# Patient Record
Sex: MALE | Race: WHITE | Employment: OTHER | URBAN - NONMETROPOLITAN AREA
[De-identification: names, ages, dates, MRNs, and addresses within clinical notes are randomized per-mention and may not be internally consistent; named-entity substitution may affect disease eponyms.]

---

## 2021-02-11 ENCOUNTER — OFFICE VISIT (OUTPATIENT)
Dept: ORTHOPEDIC SURGERY | Age: 76
End: 2021-02-11
Payer: MEDICARE

## 2021-02-11 VITALS — WEIGHT: 235 LBS | BODY MASS INDEX: 34.8 KG/M2 | HEIGHT: 69 IN

## 2021-02-11 DIAGNOSIS — M23.203 DEGENERATIVE TEAR OF MEDIAL MENISCUS OF RIGHT KNEE: Primary | ICD-10-CM

## 2021-02-11 PROCEDURE — 20610 DRAIN/INJ JOINT/BURSA W/O US: CPT | Performed by: ORTHOPAEDIC SURGERY

## 2021-02-11 PROCEDURE — 99203 OFFICE O/P NEW LOW 30 MIN: CPT | Performed by: ORTHOPAEDIC SURGERY

## 2021-02-11 RX ORDER — ROSUVASTATIN CALCIUM 40 MG/1
TABLET, COATED ORAL
COMMUNITY
Start: 2020-11-19

## 2021-02-11 RX ORDER — ALLOPURINOL 100 MG/1
TABLET ORAL
COMMUNITY
Start: 2021-01-16

## 2021-02-11 NOTE — PROGRESS NOTES
KNEE VISIT      HISTORY OF PRESENT ILLNESS    Pillo Mcdaniel is a 66 y.o. male who presents for evaluation of right knee pain is had on and off for a long time but recently has had increased pain this causes knee to give out and caused him to nearly fall. The pain he grades 4-6 over 10 in the medial side of his right knee. Once he gets up and down is okay but it is a twisting seems to aggravate it. He has a lot of pain behind the knee also. ROS    Well-documented patient history form dated 2/11/2021  All other ROS negative except for above. Past Surgical history    Past Surgical History:   Procedure Laterality Date    CORONARY ANGIOPLASTY WITH STENT PLACEMENT         PAST MEDICAL    Past Medical History:   Diagnosis Date    Arthritis     Hypertension        Allergies    Allergies   Allergen Reactions    Lipitor [Atorvastatin]        Meds    Current Outpatient Medications   Medication Sig Dispense Refill    allopurinol (ZYLOPRIM) 100 MG tablet       rosuvastatin (CRESTOR) 40 MG tablet       pantoprazole (PROTONIX) 40 MG tablet       levothyroxine (SYNTHROID) 112 MCG tablet       amLODIPine (NORVASC) 10 MG tablet       ADVAIR DISKUS 250-50 MCG/DOSE AEPB       SPIRIVA HANDIHALER 18 MCG inhalation capsule       lisinopril (PRINIVIL;ZESTRIL) 40 MG tablet       NITROSTAT 0.4 MG SL tablet       clopidogrel (PLAVIX) 75 MG tablet       aspirin 81 MG tablet Take 81 mg by mouth daily      Omega-3 Fatty Acids (FISH OIL) 1000 MG CAPS Take 3,000 mg by mouth 2 times daily      Multiple Vitamins-Minerals (THERAPEUTIC MULTIVITAMIN-MINERALS) tablet Take 1 tablet by mouth daily      FIBER PO Take by mouth       No current facility-administered medications for this visit.         Social    Social History     Socioeconomic History    Marital status:      Spouse name: Not on file    Number of children: Not on file    Years of education: Not on file    Highest education level: Not on file Anterior drawer [] [x] [] [x]   Lachman [] [x] [] [x]   Posterior drawer [] [x] [] [x]   Varus testing [] [x] [] [x]   Valgus testing [x] [] [] [x]   Joint line tenderness [x] [] [] [x]     Additional Exam Comments: His neurocirculatory lymphatic exam otherwise is normal symmetric both lower extremities. He does have pain along the medial compartment of his right knee medially to direct palpation valgus stress and Marisol's maneuver. IMAGING STUDIES    X-rays 3 views of the right knee taken at [facility] reveal minimal degenerative change mostly in the patellofemoral joint    IMPRESSION    Right knee pain secondary to degenerative medial meniscus tear    PLAN      1. Conservative care options including physical therapy, NSAIDs, bracing, and activity modification were discussed. 2.  The indications for therapeutic injections were discussed. 3.  The indications for additional imaging studies were discussed. 4.  After considering the various options discussed, the patient elected to pursue a course that includes cortisone injection right knee today and if he is not substantial improved over next few weeks consider scanning    Recommendation is for a cortisone injection into the right knee. After informed consent was received from the patient, the right knee was injected with 1 mL(40mg)Depo-Medrol and 4 mL of 0.25% Marcaine  in the syringe from an anterolateral joint line approach, using a 25-gauge needle, under sterile Betadine prep, using ethyl chloride as a topical refrigerant, for a diagnosis of osteoarthritis. The patient appeared to tolerate it well. The patient should return here periodically as needed. Encounter Diagnosis   Name Primary?  Degenerative tear of medial meniscus of right knee Yes        No orders of the defined types were placed in this encounter.

## 2021-02-18 DIAGNOSIS — M23.203 DEGENERATIVE TEAR OF MEDIAL MENISCUS OF RIGHT KNEE: Primary | ICD-10-CM

## 2021-03-02 RX ORDER — BUPIVACAINE HYDROCHLORIDE 2.5 MG/ML
5 INJECTION, SOLUTION INFILTRATION; PERINEURAL ONCE
Status: COMPLETED | OUTPATIENT
Start: 2021-03-02 | End: 2021-03-02

## 2021-03-02 RX ORDER — METHYLPREDNISOLONE ACETATE 40 MG/ML
40 INJECTION, SUSPENSION INTRA-ARTICULAR; INTRALESIONAL; INTRAMUSCULAR; SOFT TISSUE ONCE
Status: COMPLETED | OUTPATIENT
Start: 2021-03-02 | End: 2021-03-02

## 2021-03-02 RX ADMIN — BUPIVACAINE HYDROCHLORIDE 12.5 MG: 2.5 INJECTION, SOLUTION INFILTRATION; PERINEURAL at 13:51

## 2021-03-02 RX ADMIN — METHYLPREDNISOLONE ACETATE 40 MG: 40 INJECTION, SUSPENSION INTRA-ARTICULAR; INTRALESIONAL; INTRAMUSCULAR; SOFT TISSUE at 13:51

## 2021-03-10 ENCOUNTER — OFFICE VISIT (OUTPATIENT)
Dept: ORTHOPEDIC SURGERY | Age: 76
End: 2021-03-10
Payer: MEDICARE

## 2021-03-10 VITALS — BODY MASS INDEX: 34.8 KG/M2 | WEIGHT: 235 LBS | HEIGHT: 69 IN

## 2021-03-10 DIAGNOSIS — M23.203 DEGENERATIVE TEAR OF MEDIAL MENISCUS OF RIGHT KNEE: Primary | ICD-10-CM

## 2021-03-10 PROCEDURE — 99213 OFFICE O/P EST LOW 20 MIN: CPT | Performed by: ORTHOPAEDIC SURGERY

## 2021-03-10 PROCEDURE — 20610 DRAIN/INJ JOINT/BURSA W/O US: CPT | Performed by: ORTHOPAEDIC SURGERY

## 2021-03-10 RX ORDER — BUPIVACAINE HYDROCHLORIDE 2.5 MG/ML
7 INJECTION, SOLUTION INFILTRATION; PERINEURAL ONCE
Status: COMPLETED | OUTPATIENT
Start: 2021-03-10 | End: 2021-03-10

## 2021-03-10 RX ORDER — METHYLPREDNISOLONE ACETATE 40 MG/ML
40 INJECTION, SUSPENSION INTRA-ARTICULAR; INTRALESIONAL; INTRAMUSCULAR; SOFT TISSUE ONCE
Status: COMPLETED | OUTPATIENT
Start: 2021-03-10 | End: 2021-03-10

## 2021-03-10 RX ADMIN — METHYLPREDNISOLONE ACETATE 40 MG: 40 INJECTION, SUSPENSION INTRA-ARTICULAR; INTRALESIONAL; INTRAMUSCULAR; SOFT TISSUE at 13:55

## 2021-03-10 RX ADMIN — BUPIVACAINE HYDROCHLORIDE 17.5 MG: 2.5 INJECTION, SOLUTION INFILTRATION; PERINEURAL at 13:55

## 2021-03-10 NOTE — PROGRESS NOTES
KNEE VISIT      HISTORY OF PRESENT ILLNESS    Gardenia Shankar is a 66 y.o. male who presents for evaluation of right knee pain. He says he is doing somewhat better now. He has had his MRI and is here for review. He still complains of pain over the medial joint line and anteromedial knee. He has also pain of the patellofemoral joint. ROS    Well-documented patient history form dated 2/11/2021  All other ROS negative except for above. Past Surgical history    Past Surgical History:   Procedure Laterality Date    CORONARY ANGIOPLASTY WITH STENT PLACEMENT         PAST MEDICAL    Past Medical History:   Diagnosis Date    Arthritis     Hypertension        Allergies    Allergies   Allergen Reactions    Lipitor [Atorvastatin]        Meds    Current Outpatient Medications   Medication Sig Dispense Refill    allopurinol (ZYLOPRIM) 100 MG tablet       rosuvastatin (CRESTOR) 40 MG tablet       pantoprazole (PROTONIX) 40 MG tablet       levothyroxine (SYNTHROID) 112 MCG tablet       amLODIPine (NORVASC) 10 MG tablet       ADVAIR DISKUS 250-50 MCG/DOSE AEPB       SPIRIVA HANDIHALER 18 MCG inhalation capsule       lisinopril (PRINIVIL;ZESTRIL) 40 MG tablet       NITROSTAT 0.4 MG SL tablet       clopidogrel (PLAVIX) 75 MG tablet       aspirin 81 MG tablet Take 81 mg by mouth daily      Omega-3 Fatty Acids (FISH OIL) 1000 MG CAPS Take 3,000 mg by mouth 2 times daily      Multiple Vitamins-Minerals (THERAPEUTIC MULTIVITAMIN-MINERALS) tablet Take 1 tablet by mouth daily      FIBER PO Take by mouth       No current facility-administered medications for this visit.         Social    Social History     Socioeconomic History    Marital status:      Spouse name: Not on file    Number of children: Not on file    Years of education: Not on file    Highest education level: Not on file   Occupational History    Not on file   Social Needs    Financial resource strain: Not on file    Food insecurity Worry: Not on file     Inability: Not on file    Transportation needs     Medical: Not on file     Non-medical: Not on file   Tobacco Use    Smoking status: Never Smoker    Smokeless tobacco: Current User   Substance and Sexual Activity    Alcohol use: Not on file    Drug use: Not on file    Sexual activity: Not on file   Lifestyle    Physical activity     Days per week: Not on file     Minutes per session: Not on file    Stress: Not on file   Relationships    Social connections     Talks on phone: Not on file     Gets together: Not on file     Attends Yarsani service: Not on file     Active member of club or organization: Not on file     Attends meetings of clubs or organizations: Not on file     Relationship status: Not on file    Intimate partner violence     Fear of current or ex partner: Not on file     Emotionally abused: Not on file     Physically abused: Not on file     Forced sexual activity: Not on file   Other Topics Concern    Not on file   Social History Narrative    Not on file       Family HISTORY    Family History   Problem Relation Age of Onset    Diabetes Mother     Heart Disease Mother     Diabetes Father     Heart Disease Father     Stroke Father        PHYSICAL EXAM    Vital Signs:  Ht 5' 9\" (1.753 m)   Wt 235 lb (106.6 kg)   BMI 34.70 kg/m²   General Appearance:  Normal body habitus. Alert and oriented to person, place, and time. Affect:  Normal.   Gait: Slightly antalgic. Good balance and coordination. Skin:  Intact. Sensation:  Intact. Strength:  Intact. Reflexes:  Intact. Pulses:  Intact. Knee Exam:    Effusion: Trace but he does have some fluid that is palpable in the Baker's cyst or popliteal region.     Range of Motion Right Left   Extension -5 0   Flexion 110 115     Provocative Test Right Left    Positive Negative Positive Negative   Anterior drawer [] [x] [] [x]   Lachman [] [x] [] [x]   Posterior drawer [] [x] [] [x]   Varus testing [] [x] [] [x] Valgus testing [x] [] [] [x]   Joint line tenderness [x] [] [] [x]     Additional Exam Comments: His neurocirculatory lymphatic exam otherwise is normal symmetric both lower extremities. He does have pain along the medial compartment of his right knee medially to direct palpation valgus stress and Marisol's maneuver. IMAGING STUDIES    I reviewed the MRI which demonstrates chondromalacia patella and some degeneration medial femoral condyle which is moderate. IMPRESSION    Right knee pain secondary to medial plica and chondromalacia patella    PLAN      1. Conservative care options including physical therapy, NSAIDs, bracing, and activity modification were discussed. 2.  The indications for therapeutic injections were discussed. 3.  The indications for additional imaging studies were discussed. 4.  After considering the various options discussed, the patient elected to pursue a course that includes cortisone injection right knee today and if he is not substantial improved over next few weeks consider proceeding with arthroscopic intervention. Recommendation is for a cortisone injection into the right knee. After informed consent was received from the patient, the right knee was injected with 1 mL(40mg)Depo-Medrol and 4 mL of 0.25% Marcaine  in the syringe from an anterolateral joint line approach, using a 25-gauge needle, under sterile Betadine prep, using ethyl chloride as a topical refrigerant, for a diagnosis of osteoarthritis. The patient appeared to tolerate it well. The patient should return here periodically as needed. The patient was counseled at length about the risks of shruthi Covid-19 during their perioperative period and any recovery window from their procedure. The patient was made aware that shruthi Covid-19  may worsen their prognosis for recovering from their procedure  and lend to a higher morbidity and/or mortality risk.   All material risks, benefits, and reasonable alternatives including postponing the procedure were discussed. The patient does wish to proceed with the procedure at this time. INFORMED CONSENT NOTE        We discussed the risks, benefits, and alternatives to the proposed procedure, as well as the necessity of other members of the healthcare team participating in the procedure. All questions were answered and the patient elected to proceed with the proposed procedure and signed the informed consent form.

## 2022-08-29 ENCOUNTER — HOSPITAL ENCOUNTER (OUTPATIENT)
Dept: PHYSICAL THERAPY | Age: 77
Setting detail: THERAPIES SERIES
Discharge: HOME | End: 2022-08-29
Payer: MEDICARE

## 2022-08-29 PROCEDURE — 97140 MANUAL THERAPY 1/> REGIONS: CPT

## 2022-08-29 PROCEDURE — 97161 PT EVAL LOW COMPLEX 20 MIN: CPT

## 2022-08-29 PROCEDURE — 97110 THERAPEUTIC EXERCISES: CPT

## 2022-08-29 PROCEDURE — 97112 NEUROMUSCULAR REEDUCATION: CPT

## 2022-08-29 NOTE — PLAN OF CARE
[address], [address] [address] Michelle Galvez  Phone: [phone], [phone]                                                    Physical Therapy Certification    Dear Referring Provider: Dr. Bakari Bui,    We had the pleasure of evaluating the following patient for physical therapy services at [address] [place] [address]. A summary of our findings can be found in the initial assessment below. This includes our plan of care. If you have any questions or concerns regarding these findings, please do not hesitate to contact me at the office phone number checked above. Thank you for the referral.       Physician Signature:_______________________________Date:__________________  By signing above (or electronic signature), therapists plan is approved by physician      Patient: Liam Ortiz   : 1945   MRN: [id number]  Referring Physician: Referring Provider: Bakari Bui      Evaluation Date: 2022      Medical Diagnosis Information:  Diagnosis: Right RTC Tear (M75.101)   Treatment Diagnosis: Muscle Weakness (M62.81); Right Shoulder Pain (M25.511)                                  Insurance information: PT Insurance Information: [employer] ([employer])    Precautions/ Contra-indications/Relevant Medical History: HTN, COPD, Emphysema    C-SSRS Triggered by Intake questionnaire (Past 2 wk assessment):   [x] No, Questionnaire did not trigger screening.   [] Yes, Patient intake triggered further evaluation      [] C-SSRS Screening completed  [] PCP notified via Plan of Care  [] Emergency services notified     Latex Allergy:  [x]NO      []YES     Preferred Language for Healthcare:   [x]English       []other:     SUBJECTIVE: Patient stated complaint: increased pain and decreased functional use of R UE.     ASSESSMENT: Patient is a 76 y.o. male reporting to OP PT with c/c of increased pain since this past .  Pt is noted to have decreased ROM, decreased strength, decreased functional mobility and overhead use of R UE.     FOTO Score: 53  FOTO Predicted Score: 62    Pain Scale: Current: 5/10; Max: 7/10; Best: 4/10  Easing factors: rest, cortisone shot  Provocative factors: increased over head activity      Type: [x]Constant   []Intermittent  []Radiating []Localized []other:     Numbness/Tingling: Patient reports some numbness and tingling into right hand however has gout and arthritis in his wrist bad. Functional Limitations/Impairments: [x]Lifting/reaching [x]Grooming [x]Carrying    []ADL's []Driving []Sports/Recreations   []Other:    Occupation/School: Retired     Living Status/Prior Level of Function: Independent with ADLs and IADLs     OBJECTIVE:     CERV ROM     Cervical Flexion About 50%    Cervical Extension About 25 %    Cervical SB About 25 % B    Cervical rotation About 25 % B          ROM Left Right   Shoulder Flex 140 °  120 °    Shoulder Abd 160 °  95 °    Shoulder ER Back of Head Back of Head   Shoulder IR T10 Side of Hip   Elbow Flex     Elbow Ext     Wrist Flex     Wrist Ext     Strength  Left Right   Shoulder Flex 4/5 3-/5   Shoulder Scap \" \"   Shoulder ER \" \"   Shoulder IR \" \"   Elbow Flex     Elbow Ext     Wrist Flex     Wrist Ext     Srinivasa        Reflexes/Sensation (myotomes/dermatomes):    [x]Normal    []Abnormal:      Joint mobility:    []Normal    [x]Hypo   []Hyper    Palpation: increased crepitus with ROM     Functional Mobility/Transfers: WFL    Posture: forward rounded shoulders and forward head    Bandages/Dressings/Incisions: n/a    Gait (include devices/WB status): decreased arm swing     Orthopedic Special Tests: n/a    [x] Patient history, allergies, meds reviewed. Medical chart reviewed. See intake form. Review Of Systems (ROS):  [x]Performed Review of systems (Integumentary, CardioPulmonary, Neurological) by intake and observation. Intake form has been scanned into medical record.  Patient has been instructed to contact their primary care physician regarding ROS issues if not already being addressed at this time. Co-morbidities/Complexities (which will affect course of rehabilitation):   []None           Arthritic conditions   []Rheumatoid arthritis (M05.9)  [x]Osteoarthritis (M19.91)   Cardiovascular conditions   [x]Hypertension (I10)  [x]Hyperlipidemia (E78.5)  []Angina pectoris (I20)  []Atherosclerosis (I70)   Musculoskeletal conditions   []Disc pathology   []Congenital spine pathologies   [x]Prior surgical intervention: Left Pinning of Wrist; Hx of Stent Placement  []Osteoporosis (M81.8)  []Osteopenia (M85.8)   Endocrine conditions   []Hypothyroid (E03.9)  []Hyperthyroid Gastrointestinal conditions   []Constipation (G72.91)   Metabolic conditions   []Morbid obesity (E66.01)  []Diabetes type 1(E10.65) or 2 (E11.65)   []Neuropathy (G60.9)     Pulmonary conditions   []Asthma (J45)  []Coughing   [x]COPD (J44.9)   Psychological Disorders  []Anxiety (F41.9)  []Depression (F32.9)   []Other:   [x]Other:  Gout ; Stage 4 Kidney Failure, Emphysema       Barriers to/and or personal factors that will affect rehab potential:              []Age  []Sex              []Motivation/Lack of Motivation                        []Co-Morbidities              []Cognitive Function, education/learning barriers              []Environmental, home barriers              []profession/work barriers  []past PT/medical experience  []other:  Justification:     Falls Risk Assessment (30 days):   [x] Falls Risk assessed and no intervention required.   [] Falls Risk assessed and Patient requires intervention due to being higher risk   TUG score (>12s at risk):     [] Falls education provided, including           ASSESSMENT:   Functional Impairments   [x]Noted spinal or UE joint hypomobility   []Noted spinal or UE joint hypermobility   [x]Decreased UE functional ROM   [x]Decreased UE functional strength   []Abnormal Evaluation Complexity Justification   [x] A history of present problem with:  [] no personal factors and/or comorbidities that impact the plan of care;  []1-2 personal factors and/or comorbidities that impact the plan of care  [x]3 personal factors and/or comorbidities that impact the plan of care  [x] An examination of body systems using standardized tests and measures addressing any of the following: body structures and functions (impairments), activity limitations, and/or participation restrictions;:  [] a total of 1-2 or more elements   [] a total of 3 or more elements   [x] a total of 4 or more elements   [x] A clinical presentation with:  [x] stable and/or uncomplicated characteristics   [] evolving clinical presentation with changing characteristics  [] unstable and unpredictable characteristics;   [x] Clinical decision making of [x] low, [] moderate, [] high complexity using standardized patient assessment instrument and/or measurable assessment of functional outcome. [x] EVAL (LOW) 27486 (typically 20 minutes face-to-face)  [] EVAL (MOD) 83651 (typically 30 minutes face-to-face)  [] EVAL (HIGH) 06835 (typically 45 minutes face-to-face)  [] RE-EVAL     PLAN:  Frequency/Duration:  1-2 days per week for 12 weeks:  INTERVENTIONS:  [x]  Therapeutic exercise including: strength training, ROM, for upper extremity and core   [x]  NMR activation and proprioception for UE and Core   [x]  Manual therapy as indicated for UE and spine to include: Dry Needling/IASTM, STM, PROM, Gr I-IV mobilizations, manipulation. [x] Modalities as needed that may include: thermal agents, E-stim, Biofeedback, US, iontophoresis as indicated  [x] Patient education on joint protection, postural re-education, activity modification, progression of HEP.     HEP instruction: Refer to Jazmin Mancini access code and exercises on the 1st visit treatment note    GOALS:  Patient stated goal: To be able to use R UE without increased pain    Therapist goals for Patient:   Short Term Goals: To be achieved in: 2 weeks  1. Independent in HEP and progression per patient tolerance, in order to prevent re-injury. [] Progressing: [] Met: [] Not Met: [] Adjusted   2. Patient will have a decrease in pain to facilitate improvement in movement, function, and ADLs as indicated by Functional Deficits. [] Progressing: [] Met: [] Not Met: [] Adjusted     Long Term Goals: To be achieved in: 12 weeks  1. FOTO score will be within 10 points of the predicted score to assist with reaching prior level of function. [] Progressing: [] Met: [] Not Met: [] Adjusted   2. Patient will demonstrate increased AROM flex/abd/ER/IR to 140 ° /140 ° /C6/T10 to allow for proper joint functioning as indicated by patients Functional Deficits. [] Progressing: [] Met: [] Not Met: [] Adjusted   3. Patient will demonstrate an increase in Strength R Shoulder grossly  to 4/5 to allow for proper functional mobility as indicated by patients Functional Deficits. [] Progressing: [] Met: [] Not Met: [] Adjusted   4. Patient will return to  functional activities without increased symptoms or restriction. [] Progressing: [] Met: [] Not Met: [] Adjusted   5. Patient will have decreased pain to  </= 2 /10 with all functional mobility and functional activities. [] Progressing: [] Met: [] Not Met: [] Adjusted    6.  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

## 2022-08-29 NOTE — FLOWSHEET NOTE
[facility], [address] [place] [address] [place], [address]  Phone: [phone], [phone]    Physical Therapy Treatment Note/ Progress Report:     Date:  2022    Patient Name:  Shiraz Fernando    :  1945  MRN: [id number]  Restrictions/Precautions:    Medical/Treatment Diagnosis Information:      Treatment Diagnosis: Muscle Weakness (M62.81); Right Shoulder Pain (M25.511)             Insurance/Certification information:  PT Insurance Information: [employer] ([employer])  Physician Information:  Referring Provider: Shivani Molina  Has the plan of care been signed (Y/N):        []  Yes  [x]  No     Date of Patient follow up with Physician:     Is this a Progress Report:     []  Yes  [x]  No      If Yes:  Date Range for reporting period:  Initial Eval: 2022  Beginnin2022 --- Endin2022    Progress report will be due (10 Rx or 30 days whichever is less):      Recertification will be due (POC Duration  / 90 days whichever is less): 2022      Visit # Insurance Allowable Auth Required   In Person Eval Auth Thru [employer]Here after initial evaluation [x]  Yes     []  No    Tele Health -  []  Yes     []  No    Total Eval       FOTO Score: 53 (Predicted: 62)   Date assessed: 2022     Latex Allergy:  [x]NO      []YES  Preferred Language for Healthcare:   [x]English       []other:    Pain level: Current: 5/10; Max: 7/10;  Best: 4/10    SUBJECTIVE:  See eval    OBJECTIVE: See eval  Observation:   Test measurements:      RESTRICTIONS/PRECAUTIONS:     Exercises/Interventions:   Therapeutic Ex (01699)  Therapeutic Activity (09446)  NMR re-education (96219) Sets/Reps Notes/CUES   Pulley          Scap Squeezes 20 x     Shoulder Shrug  20 x          Supine Cane Flex 15 x     Supine Cane ER 15 x     Supine Cane Press 20 x          Wall Slides 20 x 5\"    Wall Push Up 20 x               Postural Ed  10'                                                           Manual Reviewed/Progressed HEP activities related to strengthening, flexibility, endurance, ROM of scapular, scapulothoracic and UE control with self care, reaching, carrying, lifting, house/yardwork, driving/computer work  [x] (93774) Reviewed/Progressed HEP activities related to improving balance, coordination, kinesthetic sense, posture, motor skill, proprioception of scapular, scapulothoracic and UE control with self care, reaching, carrying, lifting, house/yardwork, driving/computer work      Manual Treatments:  PROM / STM / Oscillations-Mobs:  G-I, II, III, IV (PA's, Inf., Post.)  [x] (93927) Provided manual therapy to mobilize soft tissue/joints of cervical/CT, scapular GHJ and UE for the purpose of modulating pain, promoting relaxation,  increasing ROM, reducing/eliminating soft tissue swelling/inflammation/restriction, improving soft tissue extensibility and allowing for proper ROM for normal function with self care, reaching, carrying, lifting, house/yardwork, driving/computer work    Modalities:     [] GAME READY (VASO)- for significant edema, swelling, pain control. Charges:  Timed Code Treatment Minutes: 40'   Total Treatment Minutes:  54'   [facility]:  [facility] TIME:  MANUAL TIME:  UNTIMED MINUTES:  Medicare Total:   -  -  -  -  -      [x] EVAL (LOW) 78795 (typically 20 minutes face-to-face)  [] EVAL (MOD) 39147 (typically 30 minutes face-to-face)  [] EVAL (HIGH) 10650 (typically 45 minutes face-to-face)  [] RE-EVAL     [x] PG(35207) x 1    [] IONTO  [x] NMR (32459) x  1   [] VASO  [x] Manual (42421) x 1    [] Other:  [] TA x      [] Mech Traction (19555)  [] ES(attended) (39359)     [] ES (un) (99774):    ASSESSMENT SUMMARY:   Patient is a 76 y.o. male reporting to OP PT with c/c of increased pain since this past June. Pt is noted to have decreased ROM, decreased strength, decreased functional mobility and overhead use of R UE.           Patient received education on their current pathology and how their condition effects them with their functional activities. Patient understood discussion and questions were answered. Patient understands their activity limitations and understands rational for treatment progression. Pt educated on plan of care and HEP, if worsening symptoms to d/c that exercise. GOALS:   Patient stated goal: To be able to use R UE without increased pain     Therapist goals for Patient:   Short Term Goals: To be achieved in: 2 weeks  1. Independent in HEP and progression per patient tolerance, in order to prevent re-injury. [] Progressing: [] Met: [] Not Met: [] Adjusted   2. Patient will have a decrease in pain to facilitate improvement in movement, function, and ADLs as indicated by Functional Deficits. [] Progressing: [] Met: [] Not Met: [] Adjusted      Long Term Goals: To be achieved in: 12 weeks  1. FOTO score will be within 10 points of the predicted score to assist with reaching prior level of function. [] Progressing: [] Met: [] Not Met: [] Adjusted   2. Patient will demonstrate increased AROM flex/abd/ER/IR to 140 ° /140 ° /C6/T10 to allow for proper joint functioning as indicated by patients Functional Deficits. [] Progressing: [] Met: [] Not Met: [] Adjusted   3. Patient will demonstrate an increase in Strength R Shoulder grossly  to 4/5 to allow for proper functional mobility as indicated by patients Functional Deficits. [] Progressing: [] Met: [] Not Met: [] Adjusted   4. Patient will return to  functional activities without increased symptoms or restriction. [] Progressing: [] Met: [] Not Met: [] Adjusted   5. Patient will have decreased pain to  </= 2 /10 with all functional mobility and functional activities. [] Progressing: [] Met: [] Not Met: [] Adjusted     6.  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

## 2022-09-07 ENCOUNTER — HOSPITAL ENCOUNTER (OUTPATIENT)
Dept: PHYSICAL THERAPY | Age: 77
Setting detail: THERAPIES SERIES
Discharge: HOME | End: 2022-09-07
Payer: MEDICARE

## 2022-09-07 PROCEDURE — 97112 NEUROMUSCULAR REEDUCATION: CPT

## 2022-09-07 PROCEDURE — 97110 THERAPEUTIC EXERCISES: CPT

## 2022-09-07 PROCEDURE — 97140 MANUAL THERAPY 1/> REGIONS: CPT

## 2022-09-07 NOTE — FLOWSHEET NOTE
[facility], [address] [place] [address] [place] [address], [address]  Phone: [phone], [phone]    Physical Therapy Treatment Note/ Progress Report:     Date:  2022    Patient Name:  Liam Ortiz    :  1945  MRN: [id number]  Restrictions/Precautions:    Medical/Treatment Diagnosis Information:  Right RTC Tear (M75.101)  Treatment Diagnosis: Muscle Weakness (M62.81); Right Shoulder Pain (M25.511)             Insurance/Certification information:  PT Insurance Information: [employer] Medicare ([employer]Here)  Physician Information:  Referring Provider: Bakari Bui  Has the plan of care been signed (Y/N):        []  Yes  [x]  No     Date of Patient follow up with Physician:     Is this a Progress Report:     []  Yes  [x]  No      If Yes:  Date Range for reporting period:  Initial Eval: 2022  Beginnin2022 --- Endin2022    Progress report will be due (10 Rx or 30 days whichever is less): 43     Recertification will be due (POC Duration  / 90 days whichever is less): 2022      Visit # Insurance Allowable Auth Required   In Person Eval + 1 24 visits    Auth Thru [employer]Here after initial evaluation [x]  Yes     []  No    Tele Health -  []  Yes     []  No    Total Eval +        FOTO Score: 53 (Predicted: 62)   Date assessed: 2022     Latex Allergy:  [x]NO      []YES      Preferred Language for Healthcare:   [x]English       []other:    Pain level: Current: 4/10; Max: 7/10;  Best: 4/10    SUBJECTIVE:  Pt reports having     OBJECTIVE: See eval  Observation:   Test measurements:      RESTRICTIONS/PRECAUTIONS:     Exercises/Interventions:   Therapeutic Ex (78186)  Therapeutic Activity (25343)  NMR re-education (53446) Sets/Reps Notes/CUES   Pulley 5'         Scap Squeezes 20 x     Shoulder Shrug  20 x          Supine Cane Flex 15 x     Supine Cane ER 15 x     Supine Cane Press 20 x     Supine Punch 20 x     Supine ABC's 1 x          SL ER/Abd 20 x          Wall Slides 20 x 5\"    Wall Push Up with yellow swiss ball 20 x          Rows T-Band 20 x  Red TBand   Ext T-Band 20 x  Red TBand   B ER T-Band 20 x  Red TBand   Horizontal Abduction T-Band 2 x 10 Red Tband                   Postural Ed  10'                                                           Manual Intervention (26610)     Shoulder Jt Mobs 5'    PROM Shoulder 10'                        Medbridge access code:   Access Code: [email or web address]  URL: [email or web address]  Date: 08/29/2022  Prepared by: Marilu Weiss    Exercises  Supine Shoulder Flexion Extension AAROM with Dowel - 1 x daily - 7 x weekly - 1 sets - 10 reps - 10\" hold  Supine Shoulder Press with Dowel - 1 x daily - 7 x weekly - 1 sets - 20 reps  Supine Shoulder External Rotation in 45 Degrees Abduction AAROM with Dowel - 1 x daily - 7 x weekly - 1 sets - 10 reps - 10 hold  Standing Scapular Retraction - 1 x daily - 7 x weekly - 3 sets - 10 reps  Wall Push Up with Arms Wide - 1 x daily - 7 x weekly - 1 sets - 20 reps  Shoulder Flexion Wall Slide with Towel - 1 x daily - 7 x weekly - 1 sets - 10 reps - 10\" hold       Access Code: [email or web address]  URL: [email or web address]  Date: 09/07/2022  Prepared by: Marilu Weiss    Exercises  Standing Shoulder Row with Anchored Resistance - 1 x daily - 7 x weekly - 2 sets - 10 reps  Standing Shoulder Extension with Resistance - 1 x daily - 7 x weekly - 2 sets - 10 reps  Shoulder External Rotation and Scapular Retraction with Resistance - 1 x daily - 7 x weekly - 2 sets - 10 reps  Standing Shoulder Horizontal Abduction with Resistance - 1 x daily - 7 x weekly - 2 sets - 10 reps               Patient Education 10' Pt education with HEP and progression of PT along with compliance with HEP to aide with formal PT for optimal outcomes.           Therapeutic Exercise and NMR EXR  [x] (49725) Provided verbal/tactile cueing for activities related to strengthening, flexibility, endurance, ROM  for improvements in scapular, scapulothoracic and UE control with self care, reaching, carrying, lifting, house/yardwork, driving/computer work. [x] (95280) Provided verbal/tactile cueing for activities related to improving balance, coordination, kinesthetic sense, posture, motor skill, proprioception  to assist with  scapular, scapulothoracic and UE control with self care, reaching, carrying, lifting, house/yardwork, driving/computer work. Therapeutic Activities:    [x] (39766 or 42092) Provided verbal/tactile cueing for activities related to improving balance, coordination, kinesthetic sense, posture, motor skill, proprioception and motor activation to allow for proper function of scapular, scapulothoracic and UE control with self care, carrying, lifting, driving/computer work. Home Exercise Program:    [x] (66950) Reviewed/Progressed HEP activities related to strengthening, flexibility, endurance, ROM of scapular, scapulothoracic and UE control with self care, reaching, carrying, lifting, house/yardwork, driving/computer work  [x] (46180) Reviewed/Progressed HEP activities related to improving balance, coordination, kinesthetic sense, posture, motor skill, proprioception of scapular, scapulothoracic and UE control with self care, reaching, carrying, lifting, house/yardwork, driving/computer work      Manual Treatments:  PROM / STM / Oscillations-Mobs:  G-I, II, III, IV (PA's, Inf., Post.)  [x] (79488) Provided manual therapy to mobilize soft tissue/joints of cervical/CT, scapular GHJ and UE for the purpose of modulating pain, promoting relaxation,  increasing ROM, reducing/eliminating soft tissue swelling/inflammation/restriction, improving soft tissue extensibility and allowing for proper ROM for normal function with self care, reaching, carrying, lifting, house/yardwork, driving/computer work    Modalities:     [] GAME READY (VASO)- for significant edema, swelling, pain control.      Charges:  Timed Code Treatment Minutes: 54' Total Treatment Minutes:  55'   BWC:  TE TIME:  NMR TIME:  MANUAL TIME:  UNTIMED MINUTES:  Medicare Total:   -  -  -  -  -      [] EVAL (LOW) 12279 (typically 20 minutes face-to-face)  [] EVAL (MOD) 12536 (typically 30 minutes face-to-face)  [] EVAL (HIGH) 34846 (typically 45 minutes face-to-face)  [] RE-EVAL     [x] QP(20841) x 2    [] IONTO  [x] NMR (19613) x  1   [] VASO  [x] Manual (45266) x 1    [] Other:  [] TA x      [] Mech Traction (33871)  [] ES(attended) (01426)     [] ES (un) (28901):    ASSESSMENT SUMMARY:   Patient is a 76 y.o. male reporting to OP PT with c/c of increased pain since this past June. Pt is noted to have decreased ROM, decreased strength, decreased functional mobility and overhead use of R UE. Patient received education on their current pathology and how their condition effects them with their functional activities. Patient understood discussion and questions were answered. Patient understands their activity limitations and understands rational for treatment progression. Pt educated on plan of care and HEP, if worsening symptoms to d/c that exercise. GOALS:   Patient stated goal: To be able to use R UE without increased pain     Therapist goals for Patient:   Short Term Goals: To be achieved in: 2 weeks  1. Independent in HEP and progression per patient tolerance, in order to prevent re-injury. [] Progressing: [] Met: [] Not Met: [] Adjusted   2. Patient will have a decrease in pain to facilitate improvement in movement, function, and ADLs as indicated by Functional Deficits. [] Progressing: [] Met: [] Not Met: [] Adjusted      Long Term Goals: To be achieved in: 12 weeks  1. FOTO score will be within 10 points of the predicted score to assist with reaching prior level of function. [] Progressing: [] Met: [] Not Met: [] Adjusted   2.  Patient will demonstrate increased AROM flex/abd/ER/IR to 140 ° /140 ° /C6/T10 to allow for proper joint functioning as indicated by patients Functional Deficits. [] Progressing: [] Met: [] Not Met: [] Adjusted   3. Patient will demonstrate an increase in Strength R Shoulder grossly  to 4/5 to allow for proper functional mobility as indicated by patients Functional Deficits. [] Progressing: [] Met: [] Not Met: [] Adjusted   4. Patient will return to  functional activities without increased symptoms or restriction. [] Progressing: [] Met: [] Not Met: [] Adjusted   5. Patient will have decreased pain to  </= 2 /10 with all functional mobility and functional activities. [] Progressing: [] Met: [] Not Met: [] Adjusted     6. 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 [] Progression is slowed due to complexities/Impairments listed. [] Progression has been slowed due to co-morbidities.   [x] Plan just implemented, too soon to assess goals progression <30days   [] Goals require adjustment due to lack of progress  [] Patient is not progressing as expected and requires additional follow up with physician  [] Other    Prognosis for POC: [x] Good [] Fair  [] Poor    Patient requires continued skilled intervention: [x] Yes  [] No    Treatment/Activity Tolerance:  [x] Patient able to complete treatment  [] Patient limited by fatigue  [] Patient limited by pain    [] Patient limited by other medical complications  [] Other:     Return to Play: (if applicable)   []  Stage 1: Intro to Strength   []  Stage 2: Return to Run and Strength   []  Stage 3: Return to Jump and Strength   []  Stage 4: Dynamic Strength and Agility   []  Stage 5: Sport Specific Training     []  Ready to Return to Play, Meets All Above Stages   []  Not Ready for Return to Sports   Comments:                         PLAN: See eval  [x] Continue per plan of care [] Alter current plan (see comments above)  [] Plan of care initiated [] Hold pending MD visit [] Discharge    Electronically signed by:  Pam Marie, PT, MPT,ATC, cert DN     Note: If patient does not return for scheduled/ recommended follow up visits, this note will serve as a discharge from care along with most recent update on progress.

## 2022-09-14 ENCOUNTER — HOSPITAL ENCOUNTER (OUTPATIENT)
Dept: PHYSICAL THERAPY | Age: 77
Setting detail: THERAPIES SERIES
Discharge: HOME | End: 2022-09-14
Payer: MEDICARE

## 2022-09-14 PROCEDURE — 97140 MANUAL THERAPY 1/> REGIONS: CPT

## 2022-09-14 PROCEDURE — 97112 NEUROMUSCULAR REEDUCATION: CPT

## 2022-09-14 PROCEDURE — 97110 THERAPEUTIC EXERCISES: CPT

## 2022-09-14 NOTE — FLOWSHEET NOTE
[facility], [address] [place] [address] Shawn [place]wood 62, [address]  Phone: [phone], [phone]    Physical Therapy Treatment Note/ Progress Report:     Date:  2022    Patient Name:  Nikia Huber    :  1945  MRN: [id number]  Restrictions/Precautions:    Medical/Treatment Diagnosis Information:  Right RTC Tear (M75.101)  Treatment Diagnosis: Muscle Weakness (M62.81); Right Shoulder Pain (M25.511)             Insurance/Certification information:  PT Insurance Information: [employer] Medicare (CoHere)  Physician Information:  Referring Provider: Víctor Niño  Has the plan of care been signed (Y/N):        []  Yes  [x]  No     Date of Patient follow up with Physician:     Is this a Progress Report:     []  Yes  [x]  No      If Yes:  Date Range for reporting period:  Initial Eval: 2022  Beginnin2022 --- Endin2022    Progress report will be due (10 Rx or 30 days whichever is less): 588     Recertification will be due (POC Duration  / 90 days whichever is less): 2022      Visit # Insurance Allowable Auth Required   In Person Eval +  24 visits  (22 - 22)  Meet Patch Thru [employer] after initial evaluation [x]  Yes     []  No    Tele Health -  []  Yes     []  No    Total Eval +        FOTO Score: 53 (Predicted: 62)   Date assessed: 2022     Latex Allergy:  [x]NO      []YES      Preferred Language for Healthcare:   [x]English       []other:    Pain level: Current: 4/10; Max: 7/10;  Best: 4/10    SUBJECTIVE:  Pt reports having     OBJECTIVE: See eval  Observation:   Test measurements:      RESTRICTIONS/PRECAUTIONS:     Exercises/Interventions:   Therapeutic Ex (85436)  Therapeutic Activity (34143)  NMR re-education (80641) Sets/Reps Notes/CUES   Pulley 5'         Scap Squeezes 20 x     Shoulder Shrug  20 x          Supine Cane Flex 10 x 10\"     Supine Cane ER 10 x 10\"    Supine Cane Press 20 x     Supine Cane Punch 20 x     Supine ABC's 1 x     Supine Scap Squeeze 20 x     Supine Horizontal Abd  2 x 10 x              SL ER/Abd 20 x          Wall Slides 20 x 5\"    Wall Push Up with yellow swiss ball 20 x          Rows T-Band 20 x  Red TBand   Ext T-Band 20 x  Red TBand   B ER T-Band 20 x  Red TBand   Horizontal Abduction T-Band 2 x 10 Red Tband                   Postural Ed  10'                                                           Manual Intervention (52277)     Shoulder Jt Mobs 5'    PROM Shoulder 5'                        Medbridge access code:   Access Code: [email or web address]  URL: [email or web address]  Date: 08/29/2022  Prepared by: Corey Lozoya    Exercises  Supine Shoulder Flexion Extension AAROM with Dowel - 1 x daily - 7 x weekly - 1 sets - 10 reps - 10\" hold  Supine Shoulder Press with Dowel - 1 x daily - 7 x weekly - 1 sets - 20 reps  Supine Shoulder External Rotation in 45 Degrees Abduction AAROM with Dowel - 1 x daily - 7 x weekly - 1 sets - 10 reps - 10 hold  Standing Scapular Retraction - 1 x daily - 7 x weekly - 3 sets - 10 reps  Wall Push Up with Arms Wide - 1 x daily - 7 x weekly - 1 sets - 20 reps  Shoulder Flexion Wall Slide with Towel - 1 x daily - 7 x weekly - 1 sets - 10 reps - 10\" hold       Access Code: [email or web address]  URL: [email or web address]  Date: 09/07/2022  Prepared by: Corey Lozoya    Exercises  Standing Shoulder Row with Anchored Resistance - 1 x daily - 7 x weekly - 2 sets - 10 reps  Standing Shoulder Extension with Resistance - 1 x daily - 7 x weekly - 2 sets - 10 reps  Shoulder External Rotation and Scapular Retraction with Resistance - 1 x daily - 7 x weekly - 2 sets - 10 reps  Standing Shoulder Horizontal Abduction with Resistance - 1 x daily - 7 x weekly - 2 sets - 10 reps               Patient Education 10' Pt education with HEP and progression of PT along with compliance with HEP to aide with formal PT for optimal outcomes.           Therapeutic Exercise and NMR EXR  [x] (96602) Provided verbal/tactile cueing for activities related to strengthening, flexibility, endurance, ROM  for improvements in scapular, scapulothoracic and UE control with self care, reaching, carrying, lifting, house/yardwork, driving/computer work. [x] (70122) Provided verbal/tactile cueing for activities related to improving balance, coordination, kinesthetic sense, posture, motor skill, proprioception  to assist with  scapular, scapulothoracic and UE control with self care, reaching, carrying, lifting, house/yardwork, driving/computer work. Therapeutic Activities:    [x] (53811 or 25286) Provided verbal/tactile cueing for activities related to improving balance, coordination, kinesthetic sense, posture, motor skill, proprioception and motor activation to allow for proper function of scapular, scapulothoracic and UE control with self care, carrying, lifting, driving/computer work.      Home Exercise Program:    [x] (67470) Reviewed/Progressed HEP activities related to strengthening, flexibility, endurance, ROM of scapular, scapulothoracic and UE control with self care, reaching, carrying, lifting, house/yardwork, driving/computer work  [x] (53163) Reviewed/Progressed HEP activities related to improving balance, coordination, kinesthetic sense, posture, motor skill, proprioception of scapular, scapulothoracic and UE control with self care, reaching, carrying, lifting, house/yardwork, driving/computer work      Manual Treatments:  PROM / STM / Oscillations-Mobs:  G-I, II, III, IV (PA's, Inf., Post.)  [x] (65729) Provided manual therapy to mobilize soft tissue/joints of cervical/CT, scapular GHJ and UE for the purpose of modulating pain, promoting relaxation,  increasing ROM, reducing/eliminating soft tissue swelling/inflammation/restriction, improving soft tissue extensibility and allowing for proper ROM for normal function with self care, reaching, carrying, lifting, house/yardwork, driving/computer work    Modalities:     [] GAME READY (VASO)- for significant edema, swelling, pain control. Charges:  Timed Code Treatment Minutes: 54'   Total Treatment Minutes:  54'   [facility]:  [facility] TIME:  MANUAL TIME:  UNTIMED MINUTES:  Medicare Total:   -  -  -  -  -      [] EVAL (LOW) 86471 (typically 20 minutes face-to-face)  [] EVAL (MOD) 81539 (typically 30 minutes face-to-face)  [] EVAL (HIGH) 48366 (typically 45 minutes face-to-face)  [] RE-EVAL     [x] MV(08819) x 2    [] IONTO  [x] NMR (82046) x  1   [] VASO  [x] Manual (37881) x 1    [] Other:  [] TA x      [] Mech Traction (52499)  [] ES(attended) (46075)     [] ES (un) (28825):    ASSESSMENT SUMMARY:   Patient is a 76 y.o. male reporting to OP PT with c/c of increased pain since this past June. Pt is noted to have decreased ROM, decreased strength, decreased functional mobility and overhead use of R UE. Patient received education on their current pathology and how their condition effects them with their functional activities. Patient understood discussion and questions were answered. Patient understands their activity limitations and understands rational for treatment progression. Pt educated on plan of care and HEP, if worsening symptoms to d/c that exercise. GOALS:   Patient stated goal: To be able to use R UE without increased pain     Therapist goals for Patient:   Short Term Goals: To be achieved in: 2 weeks  1. Independent in HEP and progression per patient tolerance, in order to prevent re-injury. [] Progressing: [] Met: [] Not Met: [] Adjusted   2. Patient will have a decrease in pain to facilitate improvement in movement, function, and ADLs as indicated by Functional Deficits. [] Progressing: [] Met: [] Not Met: [] Adjusted      Long Term Goals: To be achieved in: 12 weeks  1. FOTO score will be within 10 points of the predicted score to assist with reaching prior level of function. [] Progressing: [] Met: [] Not Met: [] Adjusted   2.  Patient will demonstrate increased AROM flex/abd/ER/IR to 140 ° /140 ° /C6/T10 to allow for proper joint functioning as indicated by patients Functional Deficits. [] Progressing: [] Met: [] Not Met: [] Adjusted   3. Patient will demonstrate an increase in Strength R Shoulder grossly  to 4/5 to allow for proper functional mobility as indicated by patients Functional Deficits. [] Progressing: [] Met: [] Not Met: [] Adjusted   4. Patient will return to  functional activities without increased symptoms or restriction. [] Progressing: [] Met: [] Not Met: [] Adjusted   5. Patient will have decreased pain to  </= 2 /10 with all functional mobility and functional activities. [] Progressing: [] Met: [] Not Met: [] Adjusted     6. 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 [] Progression is slowed due to complexities/Impairments listed. [] Progression has been slowed due to co-morbidities.   [x] Plan just implemented, too soon to assess goals progression <30days   [] Goals require adjustment due to lack of progress  [] Patient is not progressing as expected and requires additional follow up with physician  [] Other    Prognosis for POC: [x] Good [] Fair  [] Poor    Patient requires continued skilled intervention: [x] Yes  [] No    Treatment/Activity Tolerance:  [x] Patient able to complete treatment  [] Patient limited by fatigue  [] Patient limited by pain    [] Patient limited by other medical complications  [] Other:     Return to Play: (if applicable)   []  Stage 1: Intro to Strength   []  Stage 2: Return to Run and Strength   []  Stage 3: Return to Jump and Strength   []  Stage 4: Dynamic Strength and Agility   []  Stage 5: Sport Specific Training     []  Ready to Return to Play, Meets All Above Stages   []  Not Ready for Return to Sports   Comments:                         PLAN: See eval  [x] Continue per plan of care [] Alter current plan (see comments above)  [] Plan of care initiated [] Hold pending MD visit [] Discharge    Electronically signed by:  Eve Chapin, PT, MPT,ATC, cert DN     Note: If patient does not return for scheduled/ recommended follow up visits, this note will serve as a discharge from care along with most recent update on progress.

## 2022-09-19 ENCOUNTER — HOSPITAL ENCOUNTER (OUTPATIENT)
Dept: PHYSICAL THERAPY | Age: 77
Setting detail: THERAPIES SERIES
Discharge: HOME | End: 2022-09-19
Payer: MEDICARE

## 2022-09-19 PROCEDURE — 97140 MANUAL THERAPY 1/> REGIONS: CPT

## 2022-09-19 PROCEDURE — 97110 THERAPEUTIC EXERCISES: CPT

## 2022-09-19 PROCEDURE — 97112 NEUROMUSCULAR REEDUCATION: CPT

## 2022-09-19 NOTE — FLOWSHEET NOTE
[facility], [address] [place] [address] Roby [place], [address]  Phone: [phone], [phone]    Physical Therapy Treatment Note/ Progress Report:     Date:  2022    Patient Name:  Bon Peña    :  1945  MRN: [id number]  Restrictions/Precautions:    Medical/Treatment Diagnosis Information:  Right RTC Tear (M75.101)  Treatment Diagnosis: Muscle Weakness (M62.81); Right Shoulder Pain (M25.511)             Insurance/Certification information:  PT Insurance Information: [employer] Medicare ([employer]Here)  Physician Information:  Referring Provider: Kelly Kessler  Has the plan of care been signed (Y/N):        []  Yes  [x]  No     Date of Patient follow up with Physician:     Is this a Progress Report:     []  Yes  [x]  No      If Yes:  Date Range for reporting period:  Initial Eval: 2022  Beginnin2022 --- Endin2022    Progress report will be due (10 Rx or 30 days whichever is less): 8095     Recertification will be due (POC Duration  / 90 days whichever is less): 2022      Visit # Insurance Allowable Auth Required   In Person Eval +  24 visits  (22 - 22)  Auth Thru [employer]Here after initial evaluation [x]  Yes     []  No    Tele Health -  []  Yes     []  No    Total Eval +        FOTO Score: 53 (Predicted: 62)   Date assessed: 2022     Latex Allergy:  [x]NO      []YES      Preferred Language for Healthcare:   [x]English       []other:    Pain level: Current: 2-4/10     SUBJECTIVE:  Pt reports feeling okay, states his other shoulder starting to hurt, also stated wants to try just doing the HEP and not come in for formal PT any more.      OBJECTIVE: See eval  Observation:   Test measurements:      RESTRICTIONS/PRECAUTIONS:     Exercises/Interventions:   Therapeutic Ex (41612)  Therapeutic Activity (28084)  NMR re-education (94008) Sets/Reps Notes/CUES   Pulley 5'         Scap Squeezes 20 x     Shoulder Shrug  20 x          Supine Cane Flex 10 x 10\" Supine Cane ER 10 x 10\"    Supine Cane Press 20 x     Supine Cane Punch 20 x     Supine ABC's 1 x     Supine Scap Squeeze 20 x     Supine Horizontal Abd  2 x 10 x              SL ER/Abd 20 x          Wall Slides 20 x 5\"    Wall Push Up with yellow swiss ball 20 x          Rows T-Band 20 x  GreenTBand   Ext T-Band 20 x  Green TBand   ER/IR TBand 20 x  Red Tband    B ER T-Band 20 x  Red TBand   Horizontal Abduction T-Band 2 x 10 Red Tband                   Postural Ed  10'                                                           Manual Intervention (97724)     Shoulder Jt Mobs 5'    PROM Shoulder 5'                        Medbridge access code:   Access Code: [email or web address]  URL: [email or web address]/  Date: 08/29/2022  Prepared by: Phil Horton    Exercises  Supine Shoulder Flexion Extension AAROM with Dowel - 1 x daily - 7 x weekly - 1 sets - 10 reps - 10\" hold  Supine Shoulder Press with Dowel - 1 x daily - 7 x weekly - 1 sets - 20 reps  Supine Shoulder External Rotation in 45 Degrees Abduction AAROM with Dowel - 1 x daily - 7 x weekly - 1 sets - 10 reps - 10 hold  Standing Scapular Retraction - 1 x daily - 7 x weekly - 3 sets - 10 reps  Wall Push Up with Arms Wide - 1 x daily - 7 x weekly - 1 sets - 20 reps  Shoulder Flexion Wall Slide with Towel - 1 x daily - 7 x weekly - 1 sets - 10 reps - 10\" hold       Access Code: [email or web address]  URL: [email or web address]  Date: 09/07/2022  Prepared by: Phil Horton    Exercises  Standing Shoulder Row with Anchored Resistance - 1 x daily - 7 x weekly - 2 sets - 10 reps  Standing Shoulder Extension with Resistance - 1 x daily - 7 x weekly - 2 sets - 10 reps  Shoulder External Rotation and Scapular Retraction with Resistance - 1 x daily - 7 x weekly - 2 sets - 10 reps  Standing Shoulder Horizontal Abduction with Resistance - 1 x daily - 7 x weekly - 2 sets - 10 reps               Patient Education 10' Pt education with HEP and progression of PT along with compliance with HEP to aide with formal PT for optimal outcomes. Therapeutic Exercise and NMR EXR  [x] (57955) Provided verbal/tactile cueing for activities related to strengthening, flexibility, endurance, ROM  for improvements in scapular, scapulothoracic and UE control with self care, reaching, carrying, lifting, house/yardwork, driving/computer work. [x] (98532) Provided verbal/tactile cueing for activities related to improving balance, coordination, kinesthetic sense, posture, motor skill, proprioception  to assist with  scapular, scapulothoracic and UE control with self care, reaching, carrying, lifting, house/yardwork, driving/computer work. Therapeutic Activities:    [x] (00150 or 06066) Provided verbal/tactile cueing for activities related to improving balance, coordination, kinesthetic sense, posture, motor skill, proprioception and motor activation to allow for proper function of scapular, scapulothoracic and UE control with self care, carrying, lifting, driving/computer work.      Home Exercise Program:    [x] (01354) Reviewed/Progressed HEP activities related to strengthening, flexibility, endurance, ROM of scapular, scapulothoracic and UE control with self care, reaching, carrying, lifting, house/yardwork, driving/computer work  [x] (23532) Reviewed/Progressed HEP activities related to improving balance, coordination, kinesthetic sense, posture, motor skill, proprioception of scapular, scapulothoracic and UE control with self care, reaching, carrying, lifting, house/yardwork, driving/computer work      Manual Treatments:  PROM / STM / Oscillations-Mobs:  G-I, II, III, IV (PA's, Inf., Post.)  [x] (89718) Provided manual therapy to mobilize soft tissue/joints of cervical/CT, scapular GHJ and UE for the purpose of modulating pain, promoting relaxation,  increasing ROM, reducing/eliminating soft tissue swelling/inflammation/restriction, improving soft tissue extensibility and allowing for proper ROM for normal function with self care, reaching, carrying, lifting, house/yardwork, driving/computer work    Modalities:     [] GAME READY (VASO)- for significant edema, swelling, pain control. Charges:  Timed Code Treatment Minutes: 54'   Total Treatment Minutes:  54'   [facility]:  [facility] TIME:  MANUAL TIME:  UNTIMED MINUTES:  Medicare Total:   -  -  -  -  -      [] EVAL (LOW) 49161 (typically 20 minutes face-to-face)  [] EVAL (MOD) 17199 (typically 30 minutes face-to-face)  [] EVAL (HIGH) 13001 (typically 45 minutes face-to-face)  [] RE-EVAL     [x] DC(67687) x 2    [] IONTO  [x] NMR (95668) x  1   [] VASO  [x] Manual (40889) x 1    [] Other:  [] TA x      [] Mech Traction (23504)  [] ES(attended) (38282)     [] ES (un) (77993):    ASSESSMENT SUMMARY:   Patient is a 76 y.o. male reporting to OP PT with c/c of increased pain since this past June. Pt is noted to have decreased ROM, decreased strength, decreased functional mobility and overhead use of R UE. Patient received education on their current pathology and how their condition effects them with their functional activities. Patient understood discussion and questions were answered. Patient understands their activity limitations and understands rational for treatment progression. Pt educated on plan of care and HEP, if worsening symptoms to d/c that exercise. GOALS:   Patient stated goal: To be able to use R UE without increased pain     Therapist goals for Patient:   Short Term Goals: To be achieved in: 2 weeks  1. Independent in HEP and progression per patient tolerance, in order to prevent re-injury. [] Progressing: [x] Met: [] Not Met: [] Adjusted   2. Patient will have a decrease in pain to facilitate improvement in movement, function, and ADLs as indicated by Functional Deficits. [] Progressing: [x] Met: [] Not Met: [] Adjusted      Long Term Goals: To be achieved in: 12 weeks  1.  FOTO score will be within 10 points of the predicted score to and Strength   []  Stage 4: Dynamic Strength and Agility   []  Stage 5: Sport Specific Training     []  Ready to Return to Play, Meets All Above Stages   []  Not Ready for Return to Sports   Comments:                         PLAN: See eval  [] Continue per plan of care [] Alter current plan (see comments above)  [] Plan of care initiated [] Hold pending MD visit [x] Discharge    Electronically signed by:  Feliberto Morales, PT, MPT,ATC, cert DN     Note: If patient does not return for scheduled/ recommended follow up visits, this note will serve as a discharge from care along with most recent update on progress.

## 2022-09-20 ENCOUNTER — APPOINTMENT (OUTPATIENT)
Dept: PHYSICAL THERAPY | Age: 77
End: 2022-09-20
Payer: MEDICARE

## 2023-06-06 ENCOUNTER — OFFICE VISIT (OUTPATIENT)
Dept: ORTHOPEDIC SURGERY | Age: 78
End: 2023-06-06

## 2023-06-06 DIAGNOSIS — M17.12 PRIMARY OSTEOARTHRITIS OF LEFT KNEE: ICD-10-CM

## 2023-06-06 DIAGNOSIS — M25.562 LEFT KNEE PAIN, UNSPECIFIED CHRONICITY: Primary | ICD-10-CM

## 2023-06-06 RX ORDER — METHYLPREDNISOLONE ACETATE 40 MG/ML
80 INJECTION, SUSPENSION INTRA-ARTICULAR; INTRALESIONAL; INTRAMUSCULAR; SOFT TISSUE ONCE
Status: COMPLETED | OUTPATIENT
Start: 2023-06-06 | End: 2023-06-06

## 2023-06-06 RX ORDER — BUPIVACAINE HYDROCHLORIDE 2.5 MG/ML
4 INJECTION, SOLUTION INFILTRATION; PERINEURAL ONCE
Status: COMPLETED | OUTPATIENT
Start: 2023-06-06 | End: 2023-06-06

## 2023-06-06 RX ADMIN — BUPIVACAINE HYDROCHLORIDE 10 MG: 2.5 INJECTION, SOLUTION INFILTRATION; PERINEURAL at 13:27

## 2023-06-06 RX ADMIN — METHYLPREDNISOLONE ACETATE 80 MG: 40 INJECTION, SUSPENSION INTRA-ARTICULAR; INTRALESIONAL; INTRAMUSCULAR; SOFT TISSUE at 13:29

## 2023-06-06 NOTE — PROGRESS NOTES
VIEWS)     Standing Status:   Future     Number of Occurrences:   1     Standing Expiration Date:   6/6/2024 20610 - UT DRAIN/INJECT LARGE JOINT/BURSA

## 2023-07-18 ENCOUNTER — OFFICE VISIT (OUTPATIENT)
Dept: ORTHOPEDIC SURGERY | Age: 78
End: 2023-07-18
Payer: MEDICARE

## 2023-07-18 DIAGNOSIS — M17.12 PRIMARY OSTEOARTHRITIS OF LEFT KNEE: Primary | ICD-10-CM

## 2023-07-18 PROCEDURE — G8427 DOCREV CUR MEDS BY ELIG CLIN: HCPCS | Performed by: STUDENT IN AN ORGANIZED HEALTH CARE EDUCATION/TRAINING PROGRAM

## 2023-07-18 PROCEDURE — G8421 BMI NOT CALCULATED: HCPCS | Performed by: STUDENT IN AN ORGANIZED HEALTH CARE EDUCATION/TRAINING PROGRAM

## 2023-07-18 PROCEDURE — 99213 OFFICE O/P EST LOW 20 MIN: CPT | Performed by: STUDENT IN AN ORGANIZED HEALTH CARE EDUCATION/TRAINING PROGRAM

## 2023-07-18 PROCEDURE — 4004F PT TOBACCO SCREEN RCVD TLK: CPT | Performed by: STUDENT IN AN ORGANIZED HEALTH CARE EDUCATION/TRAINING PROGRAM

## 2023-07-18 PROCEDURE — 1123F ACP DISCUSS/DSCN MKR DOCD: CPT | Performed by: STUDENT IN AN ORGANIZED HEALTH CARE EDUCATION/TRAINING PROGRAM

## 2023-07-18 NOTE — PROGRESS NOTES
Chief Complaint  Knee Pain (Ck lt knee)      History of Present Illness:  Debbie Lynne is a pleasant 78 y.o. male here today for repeat evaluation of his left knee. He underwent a steroid injection 6 weeks ago. The patient had a good response to that. He is reporting minimal pain in the knee today. He recently went to [place] without any major issues. Prior HPI 6/6/23:  Klaus Serrato is a 78 y.o. male here today for new patient evaluation regarding his left knee. The patient reports worsening anterior left knee pain for the past 2 weeks. He did notice a small tick on his left thigh just before that but the tick was small and has not been there very long. He denies any fever or chills. He denies any other changes in his joint pain elsewhere in his body. He reports most of his pain is not related to instability and he denies locking. Medical History:  Patient's medications, allergies, past medical, surgical, social and family histories were reviewed and updated as appropriate. Pertinent items are noted in HPI  Review of systems reviewed from Patient History Form dated on 7/18/23 and available in the patient's chart under the Media tab. Vital Signs: There were no vitals filed for this visit. Constitutional: In no apparent distress. Normal affect. Alert and oriented X3 and is cooperative. Left knee exam     Gait: No use of assistive devices. No antalgic gait. Alignment: normal alignment. Inspection/skin: Skin is intact without erythema or ecchymosis. No gross deformity. Palpation: mild crepitus. mildly tender to the lateral aspect of the patellofemoral joint. Range of Motion: There is full range of motion 0 to 140 degrees. Strength: Normal quadriceps development. Effusion: No effusion or swelling present. Ligamentous stability: No cruciate or collateral ligament instability. Neurologic and vascular: Skin is warm and well-perfused.

## 2024-03-26 ENCOUNTER — OFFICE VISIT (OUTPATIENT)
Dept: ORTHOPEDIC SURGERY | Age: 79
End: 2024-03-26
Payer: MEDICARE

## 2024-03-26 VITALS — HEIGHT: 69 IN | WEIGHT: 235 LBS | BODY MASS INDEX: 34.8 KG/M2

## 2024-03-26 DIAGNOSIS — M17.12 PRIMARY OSTEOARTHRITIS OF LEFT KNEE: Primary | ICD-10-CM

## 2024-03-26 DIAGNOSIS — M25.562 LEFT KNEE PAIN, UNSPECIFIED CHRONICITY: ICD-10-CM

## 2024-03-26 PROCEDURE — 1123F ACP DISCUSS/DSCN MKR DOCD: CPT | Performed by: PHYSICIAN ASSISTANT

## 2024-03-26 PROCEDURE — G8484 FLU IMMUNIZE NO ADMIN: HCPCS | Performed by: PHYSICIAN ASSISTANT

## 2024-03-26 PROCEDURE — 99213 OFFICE O/P EST LOW 20 MIN: CPT | Performed by: PHYSICIAN ASSISTANT

## 2024-03-26 PROCEDURE — 20610 DRAIN/INJ JOINT/BURSA W/O US: CPT | Performed by: PHYSICIAN ASSISTANT

## 2024-03-26 PROCEDURE — G8417 CALC BMI ABV UP PARAM F/U: HCPCS | Performed by: PHYSICIAN ASSISTANT

## 2024-03-26 PROCEDURE — 4004F PT TOBACCO SCREEN RCVD TLK: CPT | Performed by: PHYSICIAN ASSISTANT

## 2024-03-26 PROCEDURE — G8427 DOCREV CUR MEDS BY ELIG CLIN: HCPCS | Performed by: PHYSICIAN ASSISTANT

## 2024-03-26 RX ORDER — TRIAMCINOLONE ACETONIDE 40 MG/ML
120 INJECTION, SUSPENSION INTRA-ARTICULAR; INTRAMUSCULAR ONCE
Status: COMPLETED | OUTPATIENT
Start: 2024-03-26 | End: 2024-03-26

## 2024-03-26 RX ORDER — LIDOCAINE HYDROCHLORIDE 10 MG/ML
2 INJECTION, SOLUTION INFILTRATION; PERINEURAL ONCE
Status: COMPLETED | OUTPATIENT
Start: 2024-03-26 | End: 2024-03-26

## 2024-03-26 RX ORDER — LIDOCAINE HYDROCHLORIDE 10 MG/ML
1 INJECTION, SOLUTION INFILTRATION; PERINEURAL ONCE
Status: COMPLETED | OUTPATIENT
Start: 2024-03-26 | End: 2024-03-26

## 2024-03-26 RX ADMIN — LIDOCAINE HYDROCHLORIDE 2 ML: 10 INJECTION, SOLUTION INFILTRATION; PERINEURAL at 13:26

## 2024-03-26 RX ADMIN — TRIAMCINOLONE ACETONIDE 120 MG: 40 INJECTION, SUSPENSION INTRA-ARTICULAR; INTRAMUSCULAR at 13:28

## 2024-03-26 RX ADMIN — LIDOCAINE HYDROCHLORIDE 1 ML: 10 INJECTION, SOLUTION INFILTRATION; PERINEURAL at 13:25

## 2024-03-26 NOTE — PROGRESS NOTES
iodine and the skin was anesthetized. Using a 22 gauge needle the left knee(s) joint is injected with 2 ml 1% lidocaine and 2 ml of triamcinolone (KENALOG) 40mg/ml under the lateral aspect of the knee. The injection site was cleansed with topical isopropyl alcohol and a dressing was applied.    Complications:  None; patient tolerated the procedure well.    . Mike Geiger is in agreement with this plan. All questions were answered to patient's satisfaction and was encouraged to call with any further questions.    JOSE Beckford    Orthopedic Surgery and Sports Medicine  3/26/2024      This dictation was performed with a verbal recognition program (DRAGON) and it was checked for errors.  It is possible that there are still dictated errors within this office note.  If so, please bring any errors to my attention for an addendum.  All efforts were made to ensure that this office note is accurate.

## 2024-04-16 ENCOUNTER — OFFICE VISIT (OUTPATIENT)
Dept: ORTHOPEDIC SURGERY | Age: 79
End: 2024-04-16
Payer: MEDICARE

## 2024-04-16 VITALS — HEIGHT: 69 IN | WEIGHT: 235 LBS | BODY MASS INDEX: 34.8 KG/M2

## 2024-04-16 DIAGNOSIS — M17.12 PRIMARY OSTEOARTHRITIS OF LEFT KNEE: Primary | ICD-10-CM

## 2024-04-16 PROCEDURE — 4004F PT TOBACCO SCREEN RCVD TLK: CPT | Performed by: STUDENT IN AN ORGANIZED HEALTH CARE EDUCATION/TRAINING PROGRAM

## 2024-04-16 PROCEDURE — G8417 CALC BMI ABV UP PARAM F/U: HCPCS | Performed by: STUDENT IN AN ORGANIZED HEALTH CARE EDUCATION/TRAINING PROGRAM

## 2024-04-16 PROCEDURE — 1123F ACP DISCUSS/DSCN MKR DOCD: CPT | Performed by: STUDENT IN AN ORGANIZED HEALTH CARE EDUCATION/TRAINING PROGRAM

## 2024-04-16 PROCEDURE — G8427 DOCREV CUR MEDS BY ELIG CLIN: HCPCS | Performed by: STUDENT IN AN ORGANIZED HEALTH CARE EDUCATION/TRAINING PROGRAM

## 2024-04-16 PROCEDURE — 99213 OFFICE O/P EST LOW 20 MIN: CPT | Performed by: STUDENT IN AN ORGANIZED HEALTH CARE EDUCATION/TRAINING PROGRAM

## 2024-04-16 PROCEDURE — 20610 DRAIN/INJ JOINT/BURSA W/O US: CPT | Performed by: STUDENT IN AN ORGANIZED HEALTH CARE EDUCATION/TRAINING PROGRAM

## 2024-04-16 NOTE — PROGRESS NOTES
Chief Complaint  Knee Pain (LEFT KNEE PAIN. )      History of Present Illness:  The patient is here for repeat evaluation regarding his left knee.  The patient underwent a steroid injection several weeks ago by my physician assistant.  Unfortunately he did not notice any relief from it.  He is having a good amount of pain along the front of his knee that goes down into his anterior tibia.  He would still like to try and avoid surgery    Prior HPI 7/18/2023:  Mike Geiger is a pleasant 78 y.o. male here today for repeat evaluation of his left knee.  He underwent a steroid injection 6 weeks ago.  The patient had a good response to that.  He is reporting minimal pain in the knee today.  He recently went to [place] without any major issues.    Prior HPI 6/6/23:  Mike Geiger is a 78 y.o. male here today for new patient evaluation regarding his left knee.  The patient reports worsening anterior left knee pain for the past 2 weeks.  He did notice a small tick on his left thigh just before that but the tick was small and has not been there very long.  He denies any fever or chills.  He denies any other changes in his joint pain elsewhere in his body.  He reports most of his pain is not related to instability and he denies locking.         Medical History:  Patient's medications, allergies, past medical, surgical, social and family histories were reviewed and updated as appropriate.    Pertinent items are noted in HPI  Review of systems reviewed from Patient History Form dated on 4/16/24 and available in the patient's chart under the Media tab.       Vital Signs:  There were no vitals filed for this visit.      Constitutional: In no apparent distress. Normal affect. Alert and oriented X3 and is cooperative.       Left knee exam     Gait: No use of assistive devices. No antalgic gait.     Alignment: normal alignment.     Inspection/skin: Skin is intact without erythema or ecchymosis. No gross deformity.